# Patient Record
Sex: FEMALE | Race: WHITE | NOT HISPANIC OR LATINO | URBAN - METROPOLITAN AREA
[De-identification: names, ages, dates, MRNs, and addresses within clinical notes are randomized per-mention and may not be internally consistent; named-entity substitution may affect disease eponyms.]

---

## 2021-08-02 ENCOUNTER — TELEPHONE ENCOUNTER (OUTPATIENT)
Dept: URBAN - METROPOLITAN AREA CLINIC 72 | Facility: CLINIC | Age: 76
End: 2021-08-02

## 2021-11-29 ENCOUNTER — WEB ENCOUNTER (OUTPATIENT)
Dept: URBAN - METROPOLITAN AREA CLINIC 107 | Facility: CLINIC | Age: 76
End: 2021-11-29

## 2021-11-29 ENCOUNTER — OFFICE VISIT (OUTPATIENT)
Dept: URBAN - METROPOLITAN AREA CLINIC 107 | Facility: CLINIC | Age: 76
End: 2021-11-29
Payer: MEDICARE

## 2021-11-29 VITALS
BODY MASS INDEX: 34.75 KG/M2 | TEMPERATURE: 97.8 F | SYSTOLIC BLOOD PRESSURE: 167 MMHG | WEIGHT: 177 LBS | DIASTOLIC BLOOD PRESSURE: 84 MMHG | HEIGHT: 60 IN | HEART RATE: 82 BPM | RESPIRATION RATE: 18 BRPM

## 2021-11-29 DIAGNOSIS — R10.9 RIGHT-SIDED ABDOMINAL PAIN OF UNKNOWN CAUSE: ICD-10-CM

## 2021-11-29 DIAGNOSIS — K21.9 GASTROESOPHAGEAL REFLUX DISEASE, UNSPECIFIED WHETHER ESOPHAGITIS PRESENT: ICD-10-CM

## 2021-11-29 DIAGNOSIS — Z86.010 HISTORY OF COLON POLYPS: ICD-10-CM

## 2021-11-29 PROCEDURE — 99203 OFFICE O/P NEW LOW 30 MIN: CPT | Performed by: INTERNAL MEDICINE

## 2021-11-29 RX ORDER — GLUCOSAMINE/CHONDR SU A SOD 750-600 MG
1 CAPSULE TABLET ORAL ONCE A DAY
Status: ACTIVE | COMMUNITY

## 2021-11-29 RX ORDER — ESOMEPRAZOLE MAGNESIUM 40 MG/1
1 CAPSULE CAPSULE, DELAYED RELEASE ORAL ONCE A DAY
OUTPATIENT

## 2021-11-29 RX ORDER — ESOMEPRAZOLE MAGNESIUM 40 MG/1
1 CAPSULE CAPSULE, DELAYED RELEASE ORAL ONCE A DAY
Status: ACTIVE | COMMUNITY

## 2021-11-29 RX ORDER — EZETIMIBE AND SIMVASTATIN 10; 40 MG/1; MG/1
1 TABLET TABLET ORAL ONCE A DAY
Status: ACTIVE | COMMUNITY

## 2021-11-29 RX ORDER — LISINOPRIL 10 MG/1
1 TABLET TABLET ORAL ONCE A DAY
Status: ACTIVE | COMMUNITY

## 2021-11-29 NOTE — HPI-TODAY'S VISIT:
Ms. Vega is a 76-year-old woman with a history of hypertension and GERD referred for evaluation of colon polyp surveillance. She was previously seen by Dr. Angeles. Her last colonoscopy was in 2016, but she does not remember the results. The records are not available for review. She denies any abdominal pain, change in bowel habits, melena, red blood per rectum or family history of colon polyps or colorectal cancer. She also denies any nausea, vomiting. She takes Nexium 40 mg daily and denies any issues with heartburn or difficulty swallowing. She has had some issues with pain located in her right mid axillary line that is chronic, waxes and wanes and has no modifying factors.

## 2021-12-09 PROBLEM — 235595009: Status: ACTIVE | Noted: 2021-12-09

## 2021-12-09 PROBLEM — 314212008: Status: ACTIVE | Noted: 2021-12-09

## 2021-12-09 PROBLEM — 428283002: Status: ACTIVE | Noted: 2021-11-29

## 2022-04-05 ENCOUNTER — TELEPHONE ENCOUNTER (OUTPATIENT)
Dept: URBAN - METROPOLITAN AREA CLINIC 113 | Facility: CLINIC | Age: 77
End: 2022-04-05

## 2024-02-16 ENCOUNTER — OV EP (OUTPATIENT)
Dept: URBAN - METROPOLITAN AREA CLINIC 107 | Facility: CLINIC | Age: 79
End: 2024-02-16
Payer: MEDICARE

## 2024-02-16 VITALS
BODY MASS INDEX: 29.06 KG/M2 | TEMPERATURE: 97.3 F | HEIGHT: 60 IN | SYSTOLIC BLOOD PRESSURE: 117 MMHG | DIASTOLIC BLOOD PRESSURE: 55 MMHG | HEART RATE: 88 BPM | WEIGHT: 148 LBS

## 2024-02-16 DIAGNOSIS — K22.89 ESOPHAGEAL DILATATION: ICD-10-CM

## 2024-02-16 DIAGNOSIS — R13.10 DYSPHAGIA, UNSPECIFIED TYPE: ICD-10-CM

## 2024-02-16 DIAGNOSIS — K21.9 GASTROESOPHAGEAL REFLUX DISEASE, UNSPECIFIED WHETHER ESOPHAGITIS PRESENT: ICD-10-CM

## 2024-02-16 DIAGNOSIS — R93.89 ABNORMAL COMPUTED TOMOGRAPHY ANGIOGRAPHY (CTA): ICD-10-CM

## 2024-02-16 PROCEDURE — 99244 OFF/OP CNSLTJ NEW/EST MOD 40: CPT

## 2024-02-16 PROCEDURE — 99214 OFFICE O/P EST MOD 30 MIN: CPT

## 2024-02-16 RX ORDER — FUROSEMIDE 40 MG/1
1 TABLET TABLET ORAL ONCE A DAY
Status: ACTIVE | COMMUNITY

## 2024-02-16 RX ORDER — LISINOPRIL 10 MG/1
1 TABLET TABLET ORAL ONCE A DAY
Status: ACTIVE | COMMUNITY

## 2024-02-16 RX ORDER — ESOMEPRAZOLE MAGNESIUM 20 MG/1
1 CAPSULE CAPSULE, DELAYED RELEASE ORAL ONCE A DAY
Status: ACTIVE | COMMUNITY

## 2024-02-16 RX ORDER — PANTOPRAZOLE SODIUM 40 MG/1
1 TABLET TABLET, DELAYED RELEASE ORAL ONCE A DAY
Qty: 90 TABLET | Refills: 0 | OUTPATIENT
Start: 2024-02-16

## 2024-02-16 RX ORDER — GLUCOSAMINE/CHONDR SU A SOD 750-600 MG
1 CAPSULE TABLET ORAL ONCE A DAY
Status: ACTIVE | COMMUNITY

## 2024-02-16 NOTE — HPI-TODAY'S VISIT:
Ms. Vega is a 78-year-old woman with a history of hypertension and GERD presenting for evaluation of difficulty swallowing. She was referre by Dr. Dina Reid. A copy of today's visit will be forwarded to referring provider. She was last seen in office on 11/29/2021 for evaluation for colon polyp surveillance.  At this visit she had some complaints of right mid axillary line that is chronic, waxed and waned without modifying factors.  CT abdomen pelvis was considered.  As far as her colonoscopy, records requested from Dr. Angeles's office that showed a colonoscopy in 2016 notable for polyps and recommendation for 3-year surveillance as she had a poor bowel prep.  Patient wished to defer scheduling colonoscopy at this point.  Interval history is notable for diagnosis of lung cancer.  Her most recent labs available for review from 2/1/2024 show mildly elevated BUN 24, low GFR 42.  She has recent x-ray and CTA in the setting of dyspnea.  The chest x-ray showed no acute cardiopulmonary abnormality.  But did show bibasilar increased reticular opacities likely represent subsegmental atelectasis and/or scarring.  She also had CTA on 2/13/2024 that showed no pulmonary embolus.  Soft tissue density along the right upper lobe parietal pleura.  Neoplasm is possible.  This may be an old lesion.  But if not after comparison with priors, PET-CT can further evaluate for metabolic activity.  Chronic lung changes, possibly chronic interstitial lung changes versus atypical infection.  No definite nodules identified.  Attention on follow-up recommended comparison with priors may help clarify acuity of the finding.  Dilated esophagus mostly filled with gas and inspissated material seen in the mid/upper thoracic esophagus.  The distal esophagus is thick walled.  Esophagitis is possible.  Recommended to correlate clinically and compare with any available priors.  Vascular calcification prominent heart size. Today she reports she has been experiencing shortness of breath and difficulty swallowing, she reports choking spells that have occurred over the las 23 years ago, she had EGD that she reports was normal. and esophageal manometry. She also endorses nausea and vomiting. She had an ekg at the hospital yesterday. She will see Dr. Burks next week.

## 2024-02-21 PROBLEM — 40739000: Status: ACTIVE | Noted: 2024-02-21

## 2024-03-06 ENCOUNTER — OV EP (OUTPATIENT)
Dept: URBAN - METROPOLITAN AREA CLINIC 113 | Facility: CLINIC | Age: 79
End: 2024-03-06

## 2024-03-28 ENCOUNTER — OV EP (OUTPATIENT)
Dept: URBAN - METROPOLITAN AREA CLINIC 107 | Facility: CLINIC | Age: 79
End: 2024-03-28

## 2024-04-03 ENCOUNTER — OV EP (OUTPATIENT)
Dept: URBAN - METROPOLITAN AREA CLINIC 113 | Facility: CLINIC | Age: 79
End: 2024-04-03

## 2024-05-13 ENCOUNTER — WEB ENCOUNTER (OUTPATIENT)
Dept: URBAN - METROPOLITAN AREA CLINIC 107 | Facility: CLINIC | Age: 79
End: 2024-05-13

## 2024-05-13 RX ORDER — FAMOTIDINE 20 MG/1
1 TABLET AT BEDTIME AS NEEDED TABLET, FILM COATED ORAL ONCE A DAY
Qty: 90 | Refills: 3
Start: 2024-03-01

## 2024-05-13 RX ORDER — PANTOPRAZOLE SODIUM 40 MG/1
1 TABLET TABLET, DELAYED RELEASE ORAL ONCE A DAY
Qty: 90 | Refills: 3
Start: 2024-02-16

## 2024-05-23 ENCOUNTER — OFFICE VISIT (OUTPATIENT)
Dept: URBAN - METROPOLITAN AREA CLINIC 107 | Facility: CLINIC | Age: 79
End: 2024-05-23

## 2024-06-04 ENCOUNTER — ERX REFILL RESPONSE (OUTPATIENT)
Dept: URBAN - METROPOLITAN AREA CLINIC 107 | Facility: CLINIC | Age: 79
End: 2024-06-04

## 2024-06-04 RX ORDER — PANTOPRAZOLE SODIUM 40 MG/1
1 TABLET TABLET, DELAYED RELEASE ORAL ONCE A DAY
Qty: 90 | Refills: 3 | OUTPATIENT

## 2024-06-04 RX ORDER — PANTOPRAZOLE SODIUM 40 MG/1
TAKE 1 TABLET BY MOUTH EVERY DAY FOR 90 DAYS TABLET, DELAYED RELEASE ORAL
Qty: 90 TABLET | Refills: 0 | OUTPATIENT

## 2024-07-18 ENCOUNTER — OFFICE VISIT (OUTPATIENT)
Dept: URBAN - METROPOLITAN AREA CLINIC 107 | Facility: CLINIC | Age: 79
End: 2024-07-18